# Patient Record
Sex: FEMALE | Race: OTHER | NOT HISPANIC OR LATINO | URBAN - METROPOLITAN AREA
[De-identification: names, ages, dates, MRNs, and addresses within clinical notes are randomized per-mention and may not be internally consistent; named-entity substitution may affect disease eponyms.]

---
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
## 2022-03-24 ENCOUNTER — EMERGENCY (EMERGENCY)
Facility: HOSPITAL | Age: 22
LOS: 1 days | Discharge: HOME | End: 2022-03-24
Attending: EMERGENCY MEDICINE
Payer: MEDICAID

## 2022-03-24 VITALS
HEIGHT: 66 IN | RESPIRATION RATE: 19 BRPM | DIASTOLIC BLOOD PRESSURE: 71 MMHG | SYSTOLIC BLOOD PRESSURE: 121 MMHG | HEART RATE: 76 BPM | WEIGHT: 141.98 LBS | OXYGEN SATURATION: 100 % | TEMPERATURE: 98 F

## 2022-03-24 VITALS
OXYGEN SATURATION: 99 % | TEMPERATURE: 97 F | SYSTOLIC BLOOD PRESSURE: 118 MMHG | DIASTOLIC BLOOD PRESSURE: 76 MMHG | HEART RATE: 68 BPM | RESPIRATION RATE: 18 BRPM

## 2022-03-24 LAB
ALBUMIN SERPL ELPH-MCNC: 3.6 G/DL — SIGNIFICANT CHANGE UP (ref 3.5–5)
ALP SERPL-CCNC: 58 U/L — SIGNIFICANT CHANGE UP (ref 40–120)
ALT FLD-CCNC: 39 U/L DA — SIGNIFICANT CHANGE UP (ref 10–60)
ANION GAP SERPL CALC-SCNC: 7 MMOL/L — SIGNIFICANT CHANGE UP (ref 5–17)
AST SERPL-CCNC: 24 U/L — SIGNIFICANT CHANGE UP (ref 10–40)
BASOPHILS # BLD AUTO: 0.06 K/UL — SIGNIFICANT CHANGE UP (ref 0–0.2)
BASOPHILS NFR BLD AUTO: 0.5 % — SIGNIFICANT CHANGE UP (ref 0–2)
BILIRUB SERPL-MCNC: 0.2 MG/DL — SIGNIFICANT CHANGE UP (ref 0.2–1.2)
BUN SERPL-MCNC: 10 MG/DL — SIGNIFICANT CHANGE UP (ref 7–18)
CALCIUM SERPL-MCNC: 8.6 MG/DL — SIGNIFICANT CHANGE UP (ref 8.4–10.5)
CHLORIDE SERPL-SCNC: 107 MMOL/L — SIGNIFICANT CHANGE UP (ref 96–108)
CO2 SERPL-SCNC: 24 MMOL/L — SIGNIFICANT CHANGE UP (ref 22–31)
CREAT SERPL-MCNC: 0.92 MG/DL — SIGNIFICANT CHANGE UP (ref 0.5–1.3)
EGFR: 92 ML/MIN/1.73M2 — SIGNIFICANT CHANGE UP
EOSINOPHIL # BLD AUTO: 0.07 K/UL — SIGNIFICANT CHANGE UP (ref 0–0.5)
EOSINOPHIL NFR BLD AUTO: 0.6 % — SIGNIFICANT CHANGE UP (ref 0–6)
GLUCOSE SERPL-MCNC: 196 MG/DL — HIGH (ref 70–99)
HCG SERPL-ACNC: <1 MIU/ML — SIGNIFICANT CHANGE UP
HCT VFR BLD CALC: 38.2 % — SIGNIFICANT CHANGE UP (ref 34.5–45)
HGB BLD-MCNC: 12.9 G/DL — SIGNIFICANT CHANGE UP (ref 11.5–15.5)
IMM GRANULOCYTES NFR BLD AUTO: 0.2 % — SIGNIFICANT CHANGE UP (ref 0–1.5)
LYMPHOCYTES # BLD AUTO: 0.99 K/UL — LOW (ref 1–3.3)
LYMPHOCYTES # BLD AUTO: 8.5 % — LOW (ref 13–44)
MCHC RBC-ENTMCNC: 29.8 PG — SIGNIFICANT CHANGE UP (ref 27–34)
MCHC RBC-ENTMCNC: 33.8 GM/DL — SIGNIFICANT CHANGE UP (ref 32–36)
MCV RBC AUTO: 88.2 FL — SIGNIFICANT CHANGE UP (ref 80–100)
MONOCYTES # BLD AUTO: 0.19 K/UL — SIGNIFICANT CHANGE UP (ref 0–0.9)
MONOCYTES NFR BLD AUTO: 1.6 % — LOW (ref 2–14)
NEUTROPHILS # BLD AUTO: 10.25 K/UL — HIGH (ref 1.8–7.4)
NEUTROPHILS NFR BLD AUTO: 88.6 % — HIGH (ref 43–77)
NRBC # BLD: 0 /100 WBCS — SIGNIFICANT CHANGE UP (ref 0–0)
NT-PROBNP SERPL-SCNC: 64 PG/ML — SIGNIFICANT CHANGE UP (ref 0–125)
PLATELET # BLD AUTO: 198 K/UL — SIGNIFICANT CHANGE UP (ref 150–400)
POTASSIUM SERPL-MCNC: 3.2 MMOL/L — LOW (ref 3.5–5.3)
POTASSIUM SERPL-SCNC: 3.2 MMOL/L — LOW (ref 3.5–5.3)
PROT SERPL-MCNC: 6.9 G/DL — SIGNIFICANT CHANGE UP (ref 6–8.3)
RBC # BLD: 4.33 M/UL — SIGNIFICANT CHANGE UP (ref 3.8–5.2)
RBC # FLD: 12.6 % — SIGNIFICANT CHANGE UP (ref 10.3–14.5)
SODIUM SERPL-SCNC: 138 MMOL/L — SIGNIFICANT CHANGE UP (ref 135–145)
TROPONIN I, HIGH SENSITIVITY RESULT: <3 NG/L — SIGNIFICANT CHANGE UP
WBC # BLD: 11.58 K/UL — HIGH (ref 3.8–10.5)
WBC # FLD AUTO: 11.58 K/UL — HIGH (ref 3.8–10.5)

## 2022-03-24 PROCEDURE — 99285 EMERGENCY DEPT VISIT HI MDM: CPT | Mod: 25

## 2022-03-24 PROCEDURE — 36415 COLL VENOUS BLD VENIPUNCTURE: CPT

## 2022-03-24 PROCEDURE — 71046 X-RAY EXAM CHEST 2 VIEWS: CPT | Mod: 26

## 2022-03-24 PROCEDURE — 94640 AIRWAY INHALATION TREATMENT: CPT

## 2022-03-24 PROCEDURE — 85025 COMPLETE CBC W/AUTO DIFF WBC: CPT

## 2022-03-24 PROCEDURE — 80053 COMPREHEN METABOLIC PANEL: CPT

## 2022-03-24 PROCEDURE — 84484 ASSAY OF TROPONIN QUANT: CPT

## 2022-03-24 PROCEDURE — 83880 ASSAY OF NATRIURETIC PEPTIDE: CPT

## 2022-03-24 PROCEDURE — 93005 ELECTROCARDIOGRAM TRACING: CPT

## 2022-03-24 PROCEDURE — 71046 X-RAY EXAM CHEST 2 VIEWS: CPT

## 2022-03-24 PROCEDURE — 93010 ELECTROCARDIOGRAM REPORT: CPT

## 2022-03-24 PROCEDURE — 84702 CHORIONIC GONADOTROPIN TEST: CPT

## 2022-03-24 PROCEDURE — 99285 EMERGENCY DEPT VISIT HI MDM: CPT

## 2022-03-24 RX ORDER — POTASSIUM CHLORIDE 20 MEQ
40 PACKET (EA) ORAL ONCE
Refills: 0 | Status: COMPLETED | OUTPATIENT
Start: 2022-03-24 | End: 2022-03-24

## 2022-03-24 RX ORDER — ALBUTEROL 90 UG/1
2 AEROSOL, METERED ORAL
Qty: 1 | Refills: 0
Start: 2022-03-24

## 2022-03-24 RX ORDER — IPRATROPIUM/ALBUTEROL SULFATE 18-103MCG
3 AEROSOL WITH ADAPTER (GRAM) INHALATION
Refills: 0 | Status: DISCONTINUED | OUTPATIENT
Start: 2022-03-24 | End: 2022-03-28

## 2022-03-24 RX ADMIN — Medication 3 MILLILITER(S): at 18:19

## 2022-03-24 RX ADMIN — Medication 40 MILLIGRAM(S): at 18:14

## 2022-03-24 RX ADMIN — Medication 40 MILLIEQUIVALENT(S): at 22:01

## 2022-03-24 NOTE — ED PROVIDER NOTE - NS ED ROS FT
Respiratory: cough, shortness of breath, chest tightness All systems were reviewed and were otherwise negative.

## 2022-03-24 NOTE — ED PROVIDER NOTE - CLINICAL SUMMARY MEDICAL DECISION MAKING FREE TEXT BOX
Character low suspicion for PE and e/o DVT or acute risk factors for this during onset of symptoms and low risk in age group. No e/o PNA, PTX, or fluid overload on exam or CXR. No e/o myocarditis or cardiomyopathy. No arrhythmia _______________  Character c/w reactive airway dz and Fhx concerning for asthma. Given Duonebs, prednisone, with Character low suspicion for PE and e/o DVT or acute risk factors for this during onset of symptoms and low risk in age group. No e/o PNA, PTX, or fluid overload on exam or CXR. Noted T inversions on ECG though character low suspicion for myocarditis and trop/BNP negative. No e/o myocarditis or cardiomyopathy. No arrhythmia. Character c/w reactive airway dz and Fhx concerning for asthma. Given Duonebs, prednisone, with significant improvement and resolution of wheezing on rpt pulm exam. Patient is well appearing, NAD, afebrile, hemodynamically stable. Any available tests and studies were discussed with patient and mom. Discharged with rx alb, pred, instructions in further symptomatic care, return precautions, and need for PMD, pulm, cardio f/u.

## 2022-03-24 NOTE — ED PROVIDER NOTE - NSFOLLOWUPCLINICS_GEN_ALL_ED_FT
[facility]  Pulmonary Medicine  [address] [place] [address]  Phone: [phone]  Fax: [phone]  Follow Up Time: 1-3 Days

## 2022-03-24 NOTE — ED PROVIDER NOTE - NS ED MD DISPO DISCHARGE CCDA
Patient with a cough preventing sleep.  She requested a cough suppressant.  Medication sent to pharmacy.   
Patient/Caregiver provided printed discharge information.

## 2022-03-24 NOTE — ED ADULT NURSE NOTE - CAS ELECT INFOMATION PROVIDED
Follow-up with Primary Care Provider, Pulmonologist, and Cardiologist. Return to the ED for new or worsening symptoms./DC instructions

## 2022-03-24 NOTE — ED PROVIDER NOTE - CARE PROVIDER_API CALL
Kofi Prabhakarh  INTERNAL MEDICINE  [address] [place] [address]  Phone: [phone]  Fax: [phone]  Follow Up Time: 1-3 Days

## 2022-03-24 NOTE — ED PROVIDER NOTE - NSFOLLOWUPINSTRUCTIONS_ED_ALL_ED_FT
Please follow up with your primary care doctor, pulmonologist, and cardiologist in 1-2 days.  Please use albuterol every 4 hours for the next 24 hours, and after that as much as you need it for chest pain or shortness of breath or coughing.  Please use the prednisone as prescribed.  Please return to the emergency department if you have worsening chest pain, shortness of breath, dizziness, vomiting, fever, or any other symptoms.      Cough, Adult    A cough helps to clear your throat and lungs. A cough may be a sign of an illness or another medical condition.    An acute cough may only last 2–3 weeks, while a chronic cough may last 8 or more weeks.    Many things can cause a cough. They include:  •Germs (viruses or bacteria) that attack the airway.  •Breathing in things that bother (irritate) your lungs.  •Allergies.  •Asthma.  •Mucus that runs down the back of your throat (postnasal drip).  •Smoking.  •Acid backing up from the stomach into the tube that moves food from the mouth to the stomach (gastroesophageal reflux).  •Some medicines.  •Lung problems.  •Other medical conditions, such as heart failure or a blood clot in the lung (pulmonary embolism).    Follow these instructions at home:    Medicines   •Take over-the-counter and prescription medicines only as told by your doctor.  •Talk with your doctor before you take medicines that stop a cough (cough suppressants).    Lifestyle   • Do not smoke, and try not to be around smoke. Do not use any products that contain nicotine or tobacco, such as cigarettes, e-cigarettes, and chewing tobacco. If you need help quitting, ask your doctor.  •Drink enough fluid to keep your pee (urine) pale yellow.  •Avoid caffeine.  • Do not drink alcohol if your doctor tells you not to drink.    General instructions   •Watch for any changes in your cough. Tell your doctor about them.  •Always cover your mouth when you cough.  •Stay away from things that make you cough, such as perfume, candles, campfire smoke, or cleaning products.  •If the air is dry, use a cool mist vaporizer or humidifier in your home.  •If your cough is worse at night, try using extra pillows to raise your head up higher while you sleep.  •Rest as needed.  •Keep all follow-up visits as told by your doctor. This is important.    Contact a doctor if:  •You have new symptoms.  •You cough up pus.  •Your cough does not get better after 2–3 weeks, or your cough gets worse.  •Cough medicine does not help your cough and you are not sleeping well.  •You have pain that gets worse or pain that is not helped with medicine.  •You have a fever.  •You are losing weight and you do not know why.  •You have night sweats.    Get help right away if:  •You cough up blood.  •You have trouble breathing.  •Your heartbeat is very fast.    These symptoms may be an emergency. Do not wait to see if the symptoms will go away. Get medical help right away. Call your local emergency services (911 in the [place] Do not drive yourself to the hospital.     Summary  •A cough helps to clear your throat and lungs. Many things can cause a cough.  •Take over-the-counter and prescription medicines only as told by your doctor.  •Always cover your mouth when you cough.  •Contact a doctor if you have new symptoms or you have a cough that does not get better or gets worse.    This information is not intended to replace advice given to you by your health care provider. Make sure you discuss any questions you have with your health care provider.

## 2022-03-24 NOTE — ED ADULT NURSE NOTE - OBJECTIVE STATEMENT
Patient AOx3, calm, cooperative, in no acute distress, reports dry cough, constant and minimal shortness of breath. No chest pain, no difficulty breathing.

## 2022-03-24 NOTE — ED PROVIDER NOTE - PATIENT PORTAL LINK FT
You can access the FollowMyHealth Patient Portal offered by [facility] by registering at the following website: [email or web address]. By joining [facility]’s FollowMyHealth portal, you will also be able to view your health information using other applications (apps) compatible with our system.

## 2022-03-24 NOTE — ED PROVIDER NOTE - OBJECTIVE STATEMENT
20 y/o female, on no medications, presents w/ coughing x2 months. Clear and mucous productive, improves w/ albuterol, sometimes becomes short of breath and feels chest tightness. Seen by PMD and given cough suppressant w/o improvement. Her sister has asthma and she used her sister's albuterol inhaler w/ relief of symptoms, but then the symptoms returned the following day. She had recent travel, but her symptoms started before travel. Denies leg swelling, OCP use, vomiting, diarrhea, rashes, and all other symptoms. Nonsmoker, does not want HIV test. Had mild COVID in December and 2nd COVID vaccine in September. 20 y/o female, on no medications, presents w/ coughing x2 months. Clear and mucous productive, improves w/ albuterol, sometimes becomes short of breath and feels chest tightness. Seen by PMD and given cough suppressant w/o improvement. Her sister has asthma and she used her sister's albuterol inhaler w/ relief of symptoms, but then the symptoms returned the following day. She had recent travel, but her symptoms started before travel. Denies leg swelling, OCP use, vomiting, diarrhea, rashes, and all other symptoms. Had mild COVID in December and 2nd COVID vaccine in September. 22 y/o female, on no medications, presents w/ coughing x2 months, clear and mucous-productive, sometimes becomes short of breath and feels chest tightness as well. Seen by PMD and given cough suppressant w/o improvement. Her sister has asthma and she uses her sister's albuterol inhaler w/ relief of symptoms, but then the symptoms return the following day. She had recent travel, but her symptoms started before travel. Denies leg swelling, OCP use, vomiting, diarrhea, rashes, and all other symptoms. Had mild COVID in December and 2nd COVID vaccine in September.

## 2022-03-24 NOTE — ED PROVIDER NOTE - PHYSICAL EXAMINATION
Afebrile, hemodynamically stable, saturating well  NAD, well appearing, sitting comfortably in bed, no WOB, speaking full sentences, noted intermittent cough  Head NCAT  EOMI grossly, anicteric  MMM  No JVD or HJR  RRR, nml S1/S2, no m/r/g  Lungs CTAB, expiratory wheeze, good air mvmt  Abd soft, NT, ND, nml BS, no rebound or guarding  AAO, CN's 3-12 grossly intact  VIEYRA spontaneously, no leg cyanosis or edema  Skin warm, well perfused, no rashes or hives

## 2022-03-24 NOTE — ED ADULT TRIAGE NOTE - CHIEF COMPLAINT QUOTE
as per pt cough x 1 month,,shortness of breath x 3 weeks as per pt I take albuterol inhaler ( my sister"s medicine ) it helps me feel better

## 2022-03-25 PROBLEM — Z78.9 OTHER SPECIFIED HEALTH STATUS: Chronic | Status: ACTIVE | Noted: 2019-10-10

## 2022-12-19 ENCOUNTER — EMERGENCY (EMERGENCY)
Facility: HOSPITAL | Age: 22
LOS: 1 days | End: 2022-12-19
Payer: MEDICAID

## 2022-12-19 PROCEDURE — L9992: CPT

## 2022-12-20 PROBLEM — U07.1 COVID-19: Chronic | Status: ACTIVE | Noted: 2022-03-24
